# Patient Record
Sex: FEMALE | URBAN - METROPOLITAN AREA
[De-identification: names, ages, dates, MRNs, and addresses within clinical notes are randomized per-mention and may not be internally consistent; named-entity substitution may affect disease eponyms.]

---

## 2019-12-27 ENCOUNTER — HOSPITAL ENCOUNTER (OUTPATIENT)
Dept: LAB | Age: 49
Discharge: HOME | End: 2019-12-27

## 2019-12-27 PROCEDURE — 88305 TISSUE EXAM BY PATHOLOGIST: CPT

## 2023-02-02 ENCOUNTER — OFFICE VISIT (OUTPATIENT)
Dept: OBGYN CLINIC | Age: 53
End: 2023-02-02
Payer: COMMERCIAL

## 2023-02-02 VITALS
WEIGHT: 155 LBS | DIASTOLIC BLOOD PRESSURE: 74 MMHG | BODY MASS INDEX: 26.46 KG/M2 | HEIGHT: 64 IN | SYSTOLIC BLOOD PRESSURE: 122 MMHG

## 2023-02-02 DIAGNOSIS — N95.1 PERIMENOPAUSAL: ICD-10-CM

## 2023-02-02 PROCEDURE — 99204 OFFICE O/P NEW MOD 45 MIN: CPT | Performed by: OBSTETRICS & GYNECOLOGY

## 2023-02-02 ASSESSMENT — PATIENT HEALTH QUESTIONNAIRE - PHQ9
1. LITTLE INTEREST OR PLEASURE IN DOING THINGS: 0
SUM OF ALL RESPONSES TO PHQ QUESTIONS 1-9: 0
SUM OF ALL RESPONSES TO PHQ QUESTIONS 1-9: 0
SUM OF ALL RESPONSES TO PHQ9 QUESTIONS 1 & 2: 0
2. FEELING DOWN, DEPRESSED OR HOPELESS: 0
SUM OF ALL RESPONSES TO PHQ QUESTIONS 1-9: 0
SUM OF ALL RESPONSES TO PHQ QUESTIONS 1-9: 0

## 2023-02-02 NOTE — PROGRESS NOTES
HPI:  Ms. Sumaya Darby is a 46 y.o.   OB History    No obstetric history on file. who is here today as a new patient. She complains of hot flashes at night, fatigue and brain fog. This has been going on for 3-4 months. She just had a period last night, previous period was 3-4 months ago. Feels like 2x nightly now not every night, once during the day not severe  Had some increased stress- scrambled thoughts  Has been on antidepressants in past and has had withdrawal symptoms from coming off- does not think is related  All symptoms are better now    Date Performed Result   PAP More than 3 years Wnl per pt   Mammogram 10/2022 Wnl per pt    Colonoscopy 3 years Wnl per pt   Dexa never      Csec x 2 no problems with pregnancies  Has PCP- no med problems    No obstetric history on file. GYN History           No LMP recorded. Past Medical History:  No past medical history on file. Past Surgical History:  No past surgical history on file. Allergies:   Not on File    Medication History:  No current outpatient medications on file. No current facility-administered medications for this visit.        Social History:  Social History     Socioeconomic History    Marital status:      Spouse name: Not on file    Number of children: Not on file    Years of education: Not on file    Highest education level: Not on file   Occupational History    Not on file   Tobacco Use    Smoking status: Not on file    Smokeless tobacco: Not on file   Substance and Sexual Activity    Alcohol use: Not on file    Drug use: Not on file    Sexual activity: Not on file   Other Topics Concern    Not on file   Social History Narrative    Not on file     Social Determinants of Health     Financial Resource Strain: Not on file   Food Insecurity: Not on file   Transportation Needs: Not on file   Physical Activity: Not on file   Stress: Not on file   Social Connections: Not on file   Intimate Partner Violence: Not on file   Housing Stability: Not on file       Family History:  No family history on file. Review of Systems - General ROS: negative except for that discussed in HPI      ROS:  Feeling well. No dyspnea or chest pain on exertion. No abdominal pain, change in bowel habits, black or bloody stools. No urinary tract symptoms. No neurological complaints. Objective: Wt 155 lb (70.3 kg)     No results found for any visits on 02/02/23. The patient appears well, alert, oriented x 3, in no distress. Neurological is normal, no focal findings. Counseling re r/b/a of HRT  Given non severe symptoms will add OTC estroven or amberen to her regimen of vit d mag and calcium    Assessment/Plan:      Diagnosis Orders   1. Perimenopausal          Encounter Diagnoses   Name Primary? Perimenopausal      No orders of the defined types were placed in this encounter.      return annually or prn  Try otc supplements est/masood  RTO for annual   Call soonoer if symptoms worsen  Discussed risks and benefits of treatment and results of whi    Devika Valencia RN